# Patient Record
Sex: MALE | Race: WHITE
[De-identification: names, ages, dates, MRNs, and addresses within clinical notes are randomized per-mention and may not be internally consistent; named-entity substitution may affect disease eponyms.]

---

## 2019-01-25 PROBLEM — Z00.00 ENCOUNTER FOR PREVENTIVE HEALTH EXAMINATION: Status: ACTIVE | Noted: 2019-01-25

## 2019-02-11 ENCOUNTER — APPOINTMENT (OUTPATIENT)
Dept: CARDIOLOGY | Facility: CLINIC | Age: 19
End: 2019-02-11
Payer: COMMERCIAL

## 2019-02-11 ENCOUNTER — NON-APPOINTMENT (OUTPATIENT)
Age: 19
End: 2019-02-11

## 2019-02-11 VITALS
HEIGHT: 65 IN | WEIGHT: 198 LBS | HEART RATE: 59 BPM | OXYGEN SATURATION: 100 % | DIASTOLIC BLOOD PRESSURE: 70 MMHG | BODY MASS INDEX: 32.99 KG/M2 | SYSTOLIC BLOOD PRESSURE: 129 MMHG

## 2019-02-11 VITALS — DIASTOLIC BLOOD PRESSURE: 80 MMHG | SYSTOLIC BLOOD PRESSURE: 134 MMHG

## 2019-02-11 DIAGNOSIS — G40.909 EPILEPSY, UNSPECIFIED, NOT INTRACTABLE, W/OUT STATUS EPILEPTICUS: ICD-10-CM

## 2019-02-11 DIAGNOSIS — R00.1 BRADYCARDIA, UNSPECIFIED: ICD-10-CM

## 2019-02-11 DIAGNOSIS — Z78.9 OTHER SPECIFIED HEALTH STATUS: ICD-10-CM

## 2019-02-11 PROCEDURE — 93000 ELECTROCARDIOGRAM COMPLETE: CPT

## 2019-02-11 PROCEDURE — 99204 OFFICE O/P NEW MOD 45 MIN: CPT

## 2019-02-11 RX ORDER — TOPIRAMATE 100 MG/1
100 TABLET, FILM COATED ORAL TWICE DAILY
Refills: 0 | Status: ACTIVE | COMMUNITY
Start: 2019-02-11

## 2019-02-11 RX ORDER — CLOBAZAM 10 MG/1
10 TABLET ORAL EVERY 8 HOURS
Refills: 0 | Status: ACTIVE | COMMUNITY
Start: 2019-02-11

## 2019-02-11 RX ORDER — PHENOBARBITAL 64.8 MG/1
64.8 TABLET ORAL AT BEDTIME
Refills: 0 | Status: ACTIVE | COMMUNITY
Start: 2019-02-11

## 2019-02-11 NOTE — HISTORY OF PRESENT ILLNESS
[FreeTextEntry1] : Patients Father- Berny is in the room, translating\par \par Patient is a 19-year-old  male who is accompanied by his father for the office consultation. Patient is referred by the neurologist. 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 Patient denies any cardiopulmonary complaints. Patient has no prior cardiac history.

## 2019-02-11 NOTE — ASSESSMENT
[FreeTextEntry1] : EKG- Sinus Bradycardia\par \par Assessment:\par \par 1. Sinus Bradycardia\par \par Recommendations:\par 1. Holter monitor for 24 hours\par 2. ECHO\par 3. F/U 2 months

## 2019-02-25 ENCOUNTER — APPOINTMENT (OUTPATIENT)
Dept: CARDIOLOGY | Facility: CLINIC | Age: 19
End: 2019-02-25
Payer: COMMERCIAL

## 2019-02-25 PROCEDURE — 93306 TTE W/DOPPLER COMPLETE: CPT

## 2019-04-05 ENCOUNTER — INPATIENT (INPATIENT)
Facility: HOSPITAL | Age: 19
LOS: 1 days | Discharge: HOME | DRG: 101 | End: 2019-04-07
Attending: HOSPITALIST | Admitting: HOSPITALIST
Payer: SELF-PAY

## 2019-04-05 VITALS
SYSTOLIC BLOOD PRESSURE: 133 MMHG | HEART RATE: 89 BPM | RESPIRATION RATE: 16 BRPM | DIASTOLIC BLOOD PRESSURE: 84 MMHG | OXYGEN SATURATION: 98 % | TEMPERATURE: 98 F

## 2019-04-05 DIAGNOSIS — R56.9 UNSPECIFIED CONVULSIONS: ICD-10-CM

## 2019-04-05 LAB
ALBUMIN SERPL ELPH-MCNC: 4.9 G/DL — SIGNIFICANT CHANGE UP (ref 3.3–5.2)
ALP SERPL-CCNC: 79 U/L — SIGNIFICANT CHANGE UP (ref 40–120)
ALT FLD-CCNC: 40 U/L — SIGNIFICANT CHANGE UP
ANION GAP SERPL CALC-SCNC: 15 MMOL/L — SIGNIFICANT CHANGE UP (ref 5–17)
AST SERPL-CCNC: 24 U/L — SIGNIFICANT CHANGE UP
BASOPHILS # BLD AUTO: 0 K/UL — SIGNIFICANT CHANGE UP (ref 0–0.2)
BASOPHILS NFR BLD AUTO: 0.1 % — SIGNIFICANT CHANGE UP (ref 0–2)
BILIRUB SERPL-MCNC: 0.3 MG/DL — LOW (ref 0.4–2)
BUN SERPL-MCNC: 17 MG/DL — SIGNIFICANT CHANGE UP (ref 8–20)
CALCIUM SERPL-MCNC: 9.3 MG/DL — SIGNIFICANT CHANGE UP (ref 8.6–10.2)
CHLORIDE SERPL-SCNC: 103 MMOL/L — SIGNIFICANT CHANGE UP (ref 98–107)
CO2 SERPL-SCNC: 20 MMOL/L — LOW (ref 22–29)
CREAT SERPL-MCNC: 1.17 MG/DL — SIGNIFICANT CHANGE UP (ref 0.5–1.3)
EOSINOPHIL # BLD AUTO: 0 K/UL — SIGNIFICANT CHANGE UP (ref 0–0.5)
EOSINOPHIL NFR BLD AUTO: 0 % — SIGNIFICANT CHANGE UP (ref 0–5)
GLUCOSE SERPL-MCNC: 116 MG/DL — HIGH (ref 70–115)
HCT VFR BLD CALC: 49.6 % — SIGNIFICANT CHANGE UP (ref 42–52)
HGB BLD-MCNC: 16.9 G/DL — SIGNIFICANT CHANGE UP (ref 14–18)
LYMPHOCYTES # BLD AUTO: 1.1 K/UL — SIGNIFICANT CHANGE UP (ref 1–4.8)
LYMPHOCYTES # BLD AUTO: 5.4 % — LOW (ref 20–55)
MCHC RBC-ENTMCNC: 31 PG — SIGNIFICANT CHANGE UP (ref 27–31)
MCHC RBC-ENTMCNC: 34.1 G/DL — SIGNIFICANT CHANGE UP (ref 32–36)
MCV RBC AUTO: 90.8 FL — SIGNIFICANT CHANGE UP (ref 80–94)
MONOCYTES # BLD AUTO: 1.1 K/UL — HIGH (ref 0–0.8)
MONOCYTES NFR BLD AUTO: 5.7 % — SIGNIFICANT CHANGE UP (ref 3–10)
NEUTROPHILS # BLD AUTO: 17.4 K/UL — HIGH (ref 1.8–8)
NEUTROPHILS NFR BLD AUTO: 88.4 % — HIGH (ref 37–73)
PHENOBARB SERPL-MCNC: <6 UG/ML — LOW (ref 15–40)
PLATELET # BLD AUTO: 221 K/UL — SIGNIFICANT CHANGE UP (ref 150–400)
POTASSIUM SERPL-MCNC: 4.7 MMOL/L — SIGNIFICANT CHANGE UP (ref 3.5–5.3)
POTASSIUM SERPL-SCNC: 4.7 MMOL/L — SIGNIFICANT CHANGE UP (ref 3.5–5.3)
PROT SERPL-MCNC: 9.2 G/DL — HIGH (ref 6.6–8.7)
RBC # BLD: 5.46 M/UL — SIGNIFICANT CHANGE UP (ref 4.6–6.2)
RBC # FLD: 12.5 % — SIGNIFICANT CHANGE UP (ref 11–15.6)
SODIUM SERPL-SCNC: 138 MMOL/L — SIGNIFICANT CHANGE UP (ref 135–145)
WBC # BLD: 19.7 K/UL — HIGH (ref 4.8–10.8)
WBC # FLD AUTO: 19.7 K/UL — HIGH (ref 4.8–10.8)

## 2019-04-05 PROCEDURE — 70450 CT HEAD/BRAIN W/O DYE: CPT | Mod: 26

## 2019-04-05 PROCEDURE — 99285 EMERGENCY DEPT VISIT HI MDM: CPT

## 2019-04-05 PROCEDURE — 99223 1ST HOSP IP/OBS HIGH 75: CPT | Mod: GC

## 2019-04-05 PROCEDURE — 99497 ADVNCD CARE PLAN 30 MIN: CPT | Mod: 25

## 2019-04-05 RX ORDER — PHENOBARBITAL 60 MG
64.8 TABLET ORAL DAILY
Refills: 0 | Status: DISCONTINUED | OUTPATIENT
Start: 2019-04-05 | End: 2019-04-07

## 2019-04-05 RX ORDER — ENOXAPARIN SODIUM 100 MG/ML
40 INJECTION SUBCUTANEOUS EVERY 24 HOURS
Refills: 0 | Status: DISCONTINUED | OUTPATIENT
Start: 2019-04-05 | End: 2019-04-07

## 2019-04-05 RX ORDER — LEVETIRACETAM 250 MG/1
500 TABLET, FILM COATED ORAL EVERY 12 HOURS
Refills: 0 | Status: DISCONTINUED | OUTPATIENT
Start: 2019-04-05 | End: 2019-04-06

## 2019-04-05 RX ORDER — INFLUENZA VIRUS VACCINE 15; 15; 15; 15 UG/.5ML; UG/.5ML; UG/.5ML; UG/.5ML
0.5 SUSPENSION INTRAMUSCULAR ONCE
Refills: 0 | Status: DISCONTINUED | OUTPATIENT
Start: 2019-04-05 | End: 2019-04-07

## 2019-04-05 RX ORDER — TOPIRAMATE 25 MG
100 TABLET ORAL ONCE
Refills: 0 | Status: DISCONTINUED | OUTPATIENT
Start: 2019-04-05 | End: 2019-04-05

## 2019-04-05 RX ORDER — ONDANSETRON 8 MG/1
4 TABLET, FILM COATED ORAL EVERY 6 HOURS
Refills: 0 | Status: DISCONTINUED | OUTPATIENT
Start: 2019-04-05 | End: 2019-04-07

## 2019-04-05 RX ORDER — PHENOBARBITAL 60 MG
129.6 TABLET ORAL AT BEDTIME
Refills: 0 | Status: DISCONTINUED | OUTPATIENT
Start: 2019-04-05 | End: 2019-04-07

## 2019-04-05 RX ORDER — PHENOBARBITAL 60 MG
64.8 TABLET ORAL ONCE
Refills: 0 | Status: DISCONTINUED | OUTPATIENT
Start: 2019-04-05 | End: 2019-04-05

## 2019-04-05 RX ORDER — LEVETIRACETAM 250 MG/1
1000 TABLET, FILM COATED ORAL ONCE
Refills: 0 | Status: COMPLETED | OUTPATIENT
Start: 2019-04-05 | End: 2019-04-05

## 2019-04-05 RX ORDER — CLOBAZAM 10 MG/1
10 TABLET ORAL ONCE
Refills: 0 | Status: DISCONTINUED | OUTPATIENT
Start: 2019-04-05 | End: 2019-04-05

## 2019-04-05 RX ORDER — SODIUM CHLORIDE 9 MG/ML
1000 INJECTION, SOLUTION INTRAVENOUS
Refills: 0 | Status: DISCONTINUED | OUTPATIENT
Start: 2019-04-05 | End: 2019-04-06

## 2019-04-05 RX ADMIN — SODIUM CHLORIDE 125 MILLILITER(S): 9 INJECTION, SOLUTION INTRAVENOUS at 23:19

## 2019-04-05 RX ADMIN — Medication 2 MILLIGRAM(S): at 21:00

## 2019-04-05 RX ADMIN — ENOXAPARIN SODIUM 40 MILLIGRAM(S): 100 INJECTION SUBCUTANEOUS at 23:47

## 2019-04-05 RX ADMIN — LEVETIRACETAM 400 MILLIGRAM(S): 250 TABLET, FILM COATED ORAL at 18:31

## 2019-04-05 RX ADMIN — LEVETIRACETAM 1000 MILLIGRAM(S): 250 TABLET, FILM COATED ORAL at 21:46

## 2019-04-05 RX ADMIN — Medication 404 MILLIGRAM(S): at 21:45

## 2019-04-05 NOTE — ED ADULT NURSE REASSESSMENT NOTE - NS ED NURSE REASSESS COMMENT FT1
patient in no distress off to CT for chest, remains on CM, lactate sent and remains at 2.3 MDA no call from lab, patient started on another liter of fluid will recheck  labs when completed

## 2019-04-05 NOTE — ED ADULT NURSE REASSESSMENT NOTE - NS ED NURSE REASSESS COMMENT FT1
dr. mane at bedside. pt is actively seizing. pt grins teeth, clenches right fist and shakes right foot when actively seizing. ativan given as ordered. pt pupils dilated. vital signs stable, PO medication ordered for patient returned to pharmacy. new medication orders placed.

## 2019-04-05 NOTE — ED PROVIDER NOTE - ATTENDING CONTRIBUTION TO CARE
[place]: I performed a face to face evaluation of this patient and performed a full history and physical examination on the patient.  I agree with the resident's history, physical examination, and plan of the patient unless otherwise noted. My brief assessment is as follows: pt non compliant with phenobarb, onfi, topamax for 1 week due to money issue, picke dup med today, with multiple witnessed seizure while sitting down, no head trauma. Pt post ictal but waking up. NO focal deficits, no f/c. loaded with keppra, to get po meds, neuro aware, admitted to Dr. Schilling.

## 2019-04-05 NOTE — ED PROVIDER NOTE - PHYSICAL EXAMINATION
Petra Hall DO.:   GENERAL: Patient minimally responsive. Responds to sternal rub.  HEENT: NC/AT, Moist mucous membranes, PERRL, EOMI.  LUNGS: CTAB, no wheezes or crackles.   CARDIAC: RRR, no m/r/g.    ABDOMEN: Soft, NT, ND, No rebound, guarding.  EXT: No edema. No calf tenderness.   MSK: No spinal tenderness, no pain with movement, no deformities.  NEURO: A&Ox0 not responding to questioning. Moving all extremities. Not following commands.  SKIN: Warm and dry. No rash.

## 2019-04-05 NOTE — ED ADULT TRIAGE NOTE - CHIEF COMPLAINT QUOTE
Patient arrived via EMS, responsive to painful stimuli, breathing unlabored.  As per mother at bedside, patient had 10 seizures.  No falls with seizures.  As per mother, patient had no taken medication for 1 week due to father not wanting to pay for medication Patient arrived via EMS, responsive to painful stimuli, breathing unlabored.  As per mother at bedside, patient had 10 seizures.  No falls with seizures.  As per mother, patient had no taken medication for 1 week due to father not wanting to pay for medication.  MD called to bedside

## 2019-04-05 NOTE — ED ADULT NURSE REASSESSMENT NOTE - NS ED NURSE REASSESS COMMENT FT1
phenobarbital 64.8 mg wasted from 2ml vial. order changed to ivpb. waste witnessed by kennedy finley RN

## 2019-04-05 NOTE — ED ADULT NURSE NOTE - CHIEF COMPLAINT QUOTE
Patient arrived via EMS, responsive to painful stimuli, breathing unlabored.  As per mother at bedside, patient had 10 seizures.  No falls with seizures.  As per mother, patient had no taken medication for 1 week due to father not wanting to pay for medication.  MD called to bedside

## 2019-04-05 NOTE — ED ADULT NURSE REASSESSMENT NOTE - NS ED NURSE REASSESS COMMENT FT1
pt transport to 4 tower with RN and transport on cardiac monitor. family at bedside and informed of treatment plan. pt tolerated transfer

## 2019-04-05 NOTE — H&P ADULT - ATTENDING COMMENTS
16 min spent discussing advanced care planning and pt is full code as per patient mother and sister at bedside.

## 2019-04-05 NOTE — ED PROVIDER NOTE - OBJECTIVE STATEMENT
19M pmhx of Epilepsy presents from home for 10 witnessed seizures, each lasting 10 minutes (according to mother), with no return to baseline over a period of 7 hours. Seizures were his usual seizures but lasted longer. EMS state that he was on the ground, responsive only to pain and verbal. Not speaking. As per mother, patient has not been taking his antiepileptic meds (Onfi, Phenobarbital, Topamax) due to cost. In ED patient responsive only to pain and verbal. Not able to obtain ROS.    Neurologist: Dr. Blanca

## 2019-04-05 NOTE — H&P ADULT - ASSESSMENT
20 yo male admitted for breakthrough seizures in setting of noncompliance due to financial issues    1) Breakthrough seizure activity  admit to telemetery  q4 neurochecks  aspiration/seizure/fall precautions  NPO until out of post ictal state  Keppra load given, 500mg ivpb bid started  phenobarb started at home dose  clobazam and topiramate doses will be restarted as po when able  Dr. Gonzales called by ED-will evaluate in AM  SW and Case management consulted  CT head wnl  EKG    2) Hyperglycemia  f/u AM labs  NPO  no h/o diabetes or prediabetes    3) Tongue bite during episode of seizure  exam when able, pt post ictal and not following commands  wound care if needed     4) Leukocytosis  likely reactive  no s/s of infectious etiology-no reports of congestion/cough/sick contacts  f/u urinalysis

## 2019-04-05 NOTE — ED PROVIDER NOTE - CLINICAL SUMMARY MEDICAL DECISION MAKING FREE TEXT BOX
19M p/w witnessed seizures at home, not taking meds. Will get labs, CTH. Likely due to medication noncompliance.

## 2019-04-05 NOTE — ED ADULT NURSE NOTE - OBJECTIVE STATEMENT
Patient BIBA to ED today after having what family states was about 15 witnessed seizures today.  Patient responds to verbal stimuli.  Patient does answer questions.  Patient denies pain of any kind.

## 2019-04-05 NOTE — ED ADULT NURSE NOTE - NSIMPLEMENTINTERV_GEN_ALL_ED
Implemented All Universal Safety Interventions:  [place] to call system. Call bell, personal items and telephone within reach. Instruct patient to call for assistance. Room bathroom lighting operational. Non-slip footwear when patient is off stretcher. Physically safe environment: no spills, clutter or unnecessary equipment. Stretcher in lowest position, wheels locked, appropriate side rails in place.

## 2019-04-05 NOTE — H&P ADULT - HISTORY OF PRESENT ILLNESS
Pt is a 18 yo male with a pmh/o epilepsy since 4 days old, who presents to ED due to seizures which occurred after not taking his medication regimen for about a week due to finances as they cost 'thousands' of dollars. History taken from family at bedside as pt is post ictal, lethargic, and only responding to painful stimuli. Pt unable to provide ROS. Pt mother at bedside states pt presents first with jaw and tongue fasciculations which then turn to eyes rolling back and pt sometimes kicks/clenches fists/contracts arms. Pt had seizure in front of MD at bedside and tongue bite noted during beginning of jaw clenching/opening. Pt then clenched fists and contracted mainly right extremities and noted to have eyes rolled back and then closed with equally dilated pupils. Also noted to have right leg shaking. Episode did not resolve after approx 1 min and ativan given with relief. Pt had some urinary incontinence and remains post ictal at this time.

## 2019-04-06 DIAGNOSIS — G93.49 OTHER ENCEPHALOPATHY: ICD-10-CM

## 2019-04-06 DIAGNOSIS — G40.909 EPILEPSY, UNSPECIFIED, NOT INTRACTABLE, WITHOUT STATUS EPILEPTICUS: ICD-10-CM

## 2019-04-06 LAB
ALBUMIN SERPL ELPH-MCNC: 4.1 G/DL — SIGNIFICANT CHANGE UP (ref 3.3–5.2)
ALP SERPL-CCNC: 72 U/L — SIGNIFICANT CHANGE UP (ref 40–120)
ALT FLD-CCNC: 31 U/L — SIGNIFICANT CHANGE UP
ANION GAP SERPL CALC-SCNC: 16 MMOL/L — SIGNIFICANT CHANGE UP (ref 5–17)
APPEARANCE UR: CLEAR — SIGNIFICANT CHANGE UP
APTT BLD: 34.6 SEC — SIGNIFICANT CHANGE UP (ref 27.5–36.3)
APTT BLD: 35.8 SEC — SIGNIFICANT CHANGE UP (ref 27.5–36.3)
AST SERPL-CCNC: 18 U/L — SIGNIFICANT CHANGE UP
BACTERIA # UR AUTO: NEGATIVE — SIGNIFICANT CHANGE UP
BASOPHILS # BLD AUTO: 0 K/UL — SIGNIFICANT CHANGE UP (ref 0–0.2)
BASOPHILS NFR BLD AUTO: 0.2 % — SIGNIFICANT CHANGE UP (ref 0–2)
BILIRUB SERPL-MCNC: 0.3 MG/DL — LOW (ref 0.4–2)
BILIRUB UR-MCNC: NEGATIVE — SIGNIFICANT CHANGE UP
BUN SERPL-MCNC: 19 MG/DL — SIGNIFICANT CHANGE UP (ref 8–20)
CALCIUM SERPL-MCNC: 8.8 MG/DL — SIGNIFICANT CHANGE UP (ref 8.6–10.2)
CHLORIDE SERPL-SCNC: 107 MMOL/L — SIGNIFICANT CHANGE UP (ref 98–107)
CK MB CFR SERPL CALC: 1.2 NG/ML — SIGNIFICANT CHANGE UP (ref 0–6.7)
CK SERPL-CCNC: 183 U/L — SIGNIFICANT CHANGE UP (ref 30–200)
CO2 SERPL-SCNC: 19 MMOL/L — LOW (ref 22–29)
COLOR SPEC: YELLOW — SIGNIFICANT CHANGE UP
CREAT SERPL-MCNC: 1.08 MG/DL — SIGNIFICANT CHANGE UP (ref 0.5–1.3)
DIFF PNL FLD: ABNORMAL
EOSINOPHIL # BLD AUTO: 0 K/UL — SIGNIFICANT CHANGE UP (ref 0–0.5)
EOSINOPHIL NFR BLD AUTO: 0.4 % — SIGNIFICANT CHANGE UP (ref 0–5)
EPI CELLS # UR: NEGATIVE — SIGNIFICANT CHANGE UP
ETHANOL SERPL-MCNC: <10 MG/DL — SIGNIFICANT CHANGE UP
GLUCOSE SERPL-MCNC: 91 MG/DL — SIGNIFICANT CHANGE UP (ref 70–115)
GLUCOSE UR QL: NEGATIVE MG/DL — SIGNIFICANT CHANGE UP
HBA1C BLD-MCNC: 5.3 % — SIGNIFICANT CHANGE UP (ref 4–5.6)
HCT VFR BLD CALC: 45.7 % — SIGNIFICANT CHANGE UP (ref 42–52)
HGB BLD-MCNC: 15.5 G/DL — SIGNIFICANT CHANGE UP (ref 14–18)
INR BLD: 1.14 RATIO — SIGNIFICANT CHANGE UP (ref 0.88–1.16)
INR BLD: 1.17 RATIO — HIGH (ref 0.88–1.16)
KETONES UR-MCNC: ABNORMAL
LEUKOCYTE ESTERASE UR-ACNC: NEGATIVE — SIGNIFICANT CHANGE UP
LYMPHOCYTES # BLD AUTO: 2.9 K/UL — SIGNIFICANT CHANGE UP (ref 1–4.8)
LYMPHOCYTES # BLD AUTO: 20.7 % — SIGNIFICANT CHANGE UP (ref 20–55)
MAGNESIUM SERPL-MCNC: 2.8 MG/DL — HIGH (ref 1.6–2.6)
MCHC RBC-ENTMCNC: 31 PG — SIGNIFICANT CHANGE UP (ref 27–31)
MCHC RBC-ENTMCNC: 33.9 G/DL — SIGNIFICANT CHANGE UP (ref 32–36)
MCV RBC AUTO: 91.4 FL — SIGNIFICANT CHANGE UP (ref 80–94)
MONOCYTES # BLD AUTO: 1.2 K/UL — HIGH (ref 0–0.8)
MONOCYTES NFR BLD AUTO: 8.6 % — SIGNIFICANT CHANGE UP (ref 3–10)
NEUTROPHILS # BLD AUTO: 9.8 K/UL — HIGH (ref 1.8–8)
NEUTROPHILS NFR BLD AUTO: 69.8 % — SIGNIFICANT CHANGE UP (ref 37–73)
NITRITE UR-MCNC: NEGATIVE — SIGNIFICANT CHANGE UP
PCP SPEC-MCNC: SIGNIFICANT CHANGE UP
PH UR: 6 — SIGNIFICANT CHANGE UP (ref 5–8)
PHOSPHATE SERPL-MCNC: 2.8 MG/DL — SIGNIFICANT CHANGE UP (ref 2.4–4.7)
PLATELET # BLD AUTO: 216 K/UL — SIGNIFICANT CHANGE UP (ref 150–400)
POTASSIUM SERPL-MCNC: 3.8 MMOL/L — SIGNIFICANT CHANGE UP (ref 3.5–5.3)
POTASSIUM SERPL-SCNC: 3.8 MMOL/L — SIGNIFICANT CHANGE UP (ref 3.5–5.3)
PROT SERPL-MCNC: 7.9 G/DL — SIGNIFICANT CHANGE UP (ref 6.6–8.7)
PROT UR-MCNC: 30 MG/DL
PROTHROM AB SERPL-ACNC: 13.2 SEC — HIGH (ref 10–12.9)
PROTHROM AB SERPL-ACNC: 13.5 SEC — HIGH (ref 10–12.9)
RBC # BLD: 5 M/UL — SIGNIFICANT CHANGE UP (ref 4.6–6.2)
RBC # FLD: 12.8 % — SIGNIFICANT CHANGE UP (ref 11–15.6)
RBC CASTS # UR COMP ASSIST: ABNORMAL /HPF (ref 0–4)
SODIUM SERPL-SCNC: 142 MMOL/L — SIGNIFICANT CHANGE UP (ref 135–145)
SP GR SPEC: 1.02 — SIGNIFICANT CHANGE UP (ref 1.01–1.02)
TSH SERPL-MCNC: 2.87 UIU/ML — SIGNIFICANT CHANGE UP (ref 0.27–4.2)
UROBILINOGEN FLD QL: NEGATIVE MG/DL — SIGNIFICANT CHANGE UP
WBC # BLD: 14 K/UL — HIGH (ref 4.8–10.8)
WBC # FLD AUTO: 14 K/UL — HIGH (ref 4.8–10.8)
WBC UR QL: SIGNIFICANT CHANGE UP

## 2019-04-06 PROCEDURE — 99233 SBSQ HOSP IP/OBS HIGH 50: CPT

## 2019-04-06 RX ORDER — CLOBAZAM 10 MG/1
10 TABLET ORAL
Refills: 0 | Status: DISCONTINUED | OUTPATIENT
Start: 2019-04-06 | End: 2019-04-07

## 2019-04-06 RX ORDER — TOPIRAMATE 25 MG
100 TABLET ORAL
Refills: 0 | Status: DISCONTINUED | OUTPATIENT
Start: 2019-04-06 | End: 2019-04-07

## 2019-04-06 RX ADMIN — Medication 404 MILLIGRAM(S): at 12:15

## 2019-04-06 RX ADMIN — Medication 202 MILLIGRAM(S): at 23:52

## 2019-04-06 RX ADMIN — CLOBAZAM 10 MILLIGRAM(S): 10 TABLET ORAL at 15:00

## 2019-04-06 RX ADMIN — CLOBAZAM 10 MILLIGRAM(S): 10 TABLET ORAL at 21:41

## 2019-04-06 RX ADMIN — Medication 100 MILLIGRAM(S): at 14:56

## 2019-04-06 RX ADMIN — LEVETIRACETAM 420 MILLIGRAM(S): 250 TABLET, FILM COATED ORAL at 05:58

## 2019-04-06 RX ADMIN — ENOXAPARIN SODIUM 40 MILLIGRAM(S): 100 INJECTION SUBCUTANEOUS at 23:30

## 2019-04-06 NOTE — PROGRESS NOTE ADULT - ASSESSMENT
18 yo male admitted for breakthrough seizures in setting of noncompliance due to financial issues    1) Breakthrough seizure activity due to non compliance  q4 neurochecks  aspiration/seizure/fall precaution  SW and Case management consulted  CT head wnl  Neuro appreciated   Continue Topamax 100mg po q12.  Restart Onfi 10mg po q18.  Phenobarb 64.8mg po qam and 129.6 mg po qpm  Keppra stopped.  No driving.  Pt to get his insurance card on Monday as per mom. Await  assistance for d/c home      2) Hyperglycemia  A1c 5.3    3) Tongue bite during episode of seizure  exam when able, pt post ictal and not following commands  wound care if needed     4) Leukocytosis  likely reactive  no s/s of infectious etiology-no reports of congestion/cough/sick contacts  f/u urinalysis

## 2019-04-06 NOTE — CONSULT NOTE ADULT - ASSESSMENT
Impression:  Seizure disorder due to med non-compliance.    Plan:    Continue Topamax 100mg po q12.  Restart Onfi 10mg po q18.  Phenobarb 64.8mg po qam and 129.6 mg po qpm  Keppra stopped.  Maintain seizure precautions.  No driving.  D/w pt and family and Dr Traylor.  Pt needs to d/w our office for manufacturers coupons/vouchers to assist with meds if needed.  Outpatient follow up at [facility] at [phone] or [phone] with Dr Blanca.  Reconsult PRN.

## 2019-04-06 NOTE — CONSULT NOTE ADULT - SUBJECTIVE AND OBJECTIVE BOX
HPI: 19yMale  sees Dr Blanca at [facility] last office visit 01/2019 with a pmh/o chronic static encephalopathy with epilepsy since age 4 days old, who presents to ED due to seizures which occurred after not taking his medication regimen for about a week due to finances as they cost 'thousands' of dollars. History taken from family at bedside as pt is post ictal, lethargic, and only responding to painful stimuli. Pt unable to provide ROS. Pt mother at bedside states pt presents first with jaw and tongue fasciculations which then turn to eyes rolling back and pt sometimes kicks/clenches fists/contracts arms. Pt had seizure in front of MD at bedside in ER and tongue bite noted during beginning of jaw clenching/opening. Pt then clenched fists and contracted mainly right extremities and noted to have eyes rolled back and then closed with equally dilated pupils. Also noted to have right leg shaking. Episode did not resolve after approx 1 min and ativan given with relief. Pt had some urinary incontinence in ER.  Now seen at bedside on floor feels well.  They are in midst of getting insurance back by tuesday as per parents.  Home meds restarted now.  No head injuries or fevers.      PAST MEDICAL & SURGICAL HISTORY:  Epilepsy  No significant past surgical history    MEDICATIONS  (STANDING):  Clobazam 10 milliGRAM(s) Oral <User Schedule>  enoxaparin Injectable 40 milliGRAM(s) SubCutaneous every 24 hours  influenza   Vaccine 0.5 milliLiter(s) IntraMuscular once  multiple electrolytes Injection Type 1 1000 milliLiter(s) (125 mL/Hr) IV Continuous <Continuous>  PHENobarbital IVPB 64.8 milliGRAM(s) IV Intermittent daily  PHENobarbital IVPB 129.6 milliGRAM(s) IV Intermittent at bedtime  topiramate 100 milliGRAM(s) Oral two times a day    MEDICATIONS  (PRN):  LORazepam   Injectable 2 milliGRAM(s) IV Push every 15 minutes PRN breakthrough seizure  ondansetron Injectable 4 milliGRAM(s) IV Push every 6 hours PRN Nausea    Allergies    No Known Allergies    Intolerances        FAMILY HISTORY:  No pertinent family history in first degree relatives          SOCIAL HISTORY:  Denies toxic habits;     REVIEW OF SYSTEMS:    As noted in the HPI.    VITAL SIGNS:  Vital Signs Last 24 Hrs  T(C): 36.9 (06 Apr 2019 10:02), Max: 36.9 (06 Apr 2019 10:02)  T(F): 98.4 (06 Apr 2019 10:02), Max: 98.4 (06 Apr 2019 10:02)  HR: 81 (06 Apr 2019 10:02) (81 - 100)  BP: 123/74 (06 Apr 2019 10:02) (109/71 - 133/84)  BP(mean): --  RR: 16 (06 Apr 2019 10:02) (16 - 18)  SpO2: 97% (06 Apr 2019 05:54) (96% - 98%)    PHYSICAL EXAMINATION:  General: Well-developed, well nourished, in no acute distress.  Eyes: Conjunctiva and sclera clear. Fundoscopic examination was deferred.  Neck: Supple.  Cardiac: +S1 & S2; Regular.  Chest: CTA b/l.    Musculoskeletal: No tenderness on palpation of spine.  No Brudzinski/Kernig's sign.    Neurologic:  - Mental Status:  Alert, awake, oriented to person, place, and time; Speech is fair and psychomotor slowing since birth per family, and comprehension  Cranial Nerves II-XII:    II:  Visual acuity is normal for age ; Visual fields are full to confrontation; Pupils are equal, round, and reactive to light.  III, IV, VI:  Extraocular movements are intact without nystagmus.  V:  Facial sensation is intact in the V1-V3 distribution bilaterally.  VII:  Face is symmetric with normal eye closure and smile  VIII:  Hearing is intact and symmetric for age  IX, X:  Uvula is midline and soft palate rises symmetrically  XI:  Head turning and shoulder shrug are intact.  XII:  Tongue protrudes in the midline.  - Motor:  Strength is 5/5 throughout.  There is no pronator drift.  Normal muscle bulk and tone throughout.  - Reflexes:  2+ and symmetric throughout.  - Sensory:  Intact and symmetric to light touch, and joint-position sense.  - Coordination:  No dysmetria/dysdiadochokinesis.   - Gait: Deferred.      LABS:                          15.5   14.0  )-----------( 216      ( 06 Apr 2019 06:16 )             45.7     06 Apr 2019 06:16    142    |  107    |  19.0   ----------------------------<  91     3.8     |  19.0   |  1.08     Ca    8.8        06 Apr 2019 06:16  Phos  2.8       06 Apr 2019 06:16  Mg     2.8       06 Apr 2019 06:16    TPro  7.9    /  Alb  4.1    /  TBili  0.3    /  DBili  x      /  AST  18     /  ALT  31     /  AlkPhos  72     06 Apr 2019 06:16    LIVER FUNCTIONS - ( 06 Apr 2019 06:16 )  Alb: 4.1 g/dL / Pro: 7.9 g/dL / ALK PHOS: 72 U/L / ALT: 31 U/L / AST: 18 U/L / GGT: x           PT/INR - ( 06 Apr 2019 06:16 )   PT: 13.2 sec;   INR: 1.14 ratio         PTT - ( 06 Apr 2019 06:16 )  PTT:35.8 sec  Phenobarb level on admission <6  UDS + for bzd/roxanne    RADIOLOGY & ADDITIONAL STUDIES:      CT Head No Cont (04.05.19 @ 19:52) >  No acute intracranial findings.

## 2019-04-06 NOTE — PROGRESS NOTE ADULT - SUBJECTIVE AND OBJECTIVE BOX
CHIEF COMPLAINT/INTERVAL HISTORY:    Patient is a 19y old  Male who presents with a chief complaint of breakthrough seizures due to non compliance (2019 13:59)      HPI:  Pt is a 20 yo male with a pmh/o epilepsy since 4 days old, who presents to ED due to seizures which occurred after not taking his medication regimen for about a week due to finances as they cost 'thousands' of dollars. History taken from family at bedside as pt is post ictal, lethargic, and only responding to painful stimuli. Pt unable to provide ROS. Pt mother at bedside states pt presents first with jaw and tongue fasciculations which then turn to eyes rolling back and pt sometimes kicks/clenches fists/contracts arms. Pt had seizure in front of MD at bedside and tongue bite noted during beginning of jaw clenching/opening. Pt then clenched fists and contracted mainly right extremities and noted to have eyes rolled back and then closed with equally dilated pupils. Also noted to have right leg shaking. Episode did not resolve after approx 1 min and ativan given with relief. Pt had some urinary incontinence and remains post ictal at this time. (2019 21:11)      SUBJECTIVE & OBJECTIVE/ ROS: Pt seen and examined at bedside.  # [id number]. No further seizures. No chest pain, palpitations, sob, light headedness/dizziness, difficulty breathing/cough, fevers/chills, abdominal pain, n/v, diarrhea/constipation, dysuria or increased urinary frequency.     ICU Vital Signs Last 24 Hrs  T(C): 36.9 (2019 15:16), Max: 36.9 (2019 10:02)  T(F): 98.5 (2019 15:16), Max: 98.5 (2019 15:16)  HR: 64 (2019 15:16) (64 - 100)  BP: 113/74 (2019 15:16) (109/71 - 133/84)  BP(mean): --  ABP: --  ABP(mean): --  RR: 18 (2019 15:16) (16 - 18)  SpO2: 100% (2019 15:16) (96% - 100%)        MEDICATIONS  (STANDING):  Clobazam 10 milliGRAM(s) Oral <User Schedule>  enoxaparin Injectable 40 milliGRAM(s) SubCutaneous every 24 hours  influenza   Vaccine 0.5 milliLiter(s) IntraMuscular once  PHENobarbital IVPB 64.8 milliGRAM(s) IV Intermittent daily  PHENobarbital IVPB 129.6 milliGRAM(s) IV Intermittent at bedtime  topiramate 100 milliGRAM(s) Oral two times a day    MEDICATIONS  (PRN):  LORazepam   Injectable 2 milliGRAM(s) IV Push every 15 minutes PRN breakthrough seizure  ondansetron Injectable 4 milliGRAM(s) IV Push every 6 hours PRN Nausea      LABS:                        15.5   14.0  )-----------( 216      ( 2019 06:16 )             45.7     04-06    142  |  107  |  19.0  ----------------------------<  91  3.8   |  19.0<L>  |  1.08    Ca    8.8      2019 06:16  Phos  2.8     04-06  Mg     2.8     04-06    TPro  7.9  /  Alb  4.1  /  TBili  0.3<L>  /  DBili  x   /  AST  18  /  ALT  31  /  AlkPhos  72  04-06    PT/INR - ( 2019 06:16 )   PT: 13.2 sec;   INR: 1.14 ratio         PTT - ( 2019 06:16 )  PTT:35.8 sec  Urinalysis Basic - ( 2019 10:47 )    Color: Yellow / Appearance: Clear / S.020 / pH: x  Gluc: x / Ketone: Trace  / Bili: Negative / Urobili: Negative mg/dL   Blood: x / Protein: 30 mg/dL / Nitrite: Negative   Leuk Esterase: Negative / RBC: 3-5 /HPF / WBC 3-5   Sq Epi: x / Non Sq Epi: Negative / Bacteria: Negative        CAPILLARY BLOOD GLUCOSE      POCT Blood Glucose.: 91 mg/dL (2019 17:51)      RECENT CULTURES:      RADIOLOGY & ADDITIONAL TESTS:      PHYSICAL EXAM:    GENERAL: NAD, well-groomed, well-developed  HEAD:  Atraumatic, Normocephalic  EYES: EOMI, PERRLA, conjunctiva and sclera clear  ENMT: Moist mucous membranes  NECK: Supple, No JVD  NERVOUS SYSTEM:  Alert & Oriented X3, Motor Strength 5/5 B/L upper and lower extremities; DTRs 2+ intact and symmetric  CHEST/LUNG: Clear to auscultation bilaterally; No rales, rhonchi, wheezing, or rubs  HEART: Regular rate and rhythm; No murmurs, rubs, or gallops  ABDOMEN: Soft, Nontender, Nondistended; Bowel sounds present  EXTREMITIES:  2+ Peripheral Pulses, No clubbing, cyanosis, or edema

## 2019-04-07 ENCOUNTER — TRANSCRIPTION ENCOUNTER (OUTPATIENT)
Age: 19
End: 2019-04-07

## 2019-04-07 VITALS
SYSTOLIC BLOOD PRESSURE: 130 MMHG | RESPIRATION RATE: 19 BRPM | HEART RATE: 67 BPM | TEMPERATURE: 98 F | DIASTOLIC BLOOD PRESSURE: 80 MMHG | OXYGEN SATURATION: 98 %

## 2019-04-07 LAB
ANION GAP SERPL CALC-SCNC: 12 MMOL/L — SIGNIFICANT CHANGE UP (ref 5–17)
BASOPHILS # BLD AUTO: 0 K/UL — SIGNIFICANT CHANGE UP (ref 0–0.2)
BASOPHILS NFR BLD AUTO: 0.4 % — SIGNIFICANT CHANGE UP (ref 0–2)
BUN SERPL-MCNC: 18 MG/DL — SIGNIFICANT CHANGE UP (ref 8–20)
CALCIUM SERPL-MCNC: 8.7 MG/DL — SIGNIFICANT CHANGE UP (ref 8.6–10.2)
CHLORIDE SERPL-SCNC: 108 MMOL/L — HIGH (ref 98–107)
CO2 SERPL-SCNC: 21 MMOL/L — LOW (ref 22–29)
CREAT SERPL-MCNC: 1.1 MG/DL — SIGNIFICANT CHANGE UP (ref 0.5–1.3)
EOSINOPHIL # BLD AUTO: 0.1 K/UL — SIGNIFICANT CHANGE UP (ref 0–0.5)
EOSINOPHIL NFR BLD AUTO: 1.3 % — SIGNIFICANT CHANGE UP (ref 0–5)
GLUCOSE SERPL-MCNC: 86 MG/DL — SIGNIFICANT CHANGE UP (ref 70–115)
HCT VFR BLD CALC: 43.7 % — SIGNIFICANT CHANGE UP (ref 42–52)
HGB BLD-MCNC: 14.3 G/DL — SIGNIFICANT CHANGE UP (ref 14–18)
LYMPHOCYTES # BLD AUTO: 2.6 K/UL — SIGNIFICANT CHANGE UP (ref 1–4.8)
LYMPHOCYTES # BLD AUTO: 31.8 % — SIGNIFICANT CHANGE UP (ref 20–55)
MAGNESIUM SERPL-MCNC: 2.7 MG/DL — HIGH (ref 1.6–2.6)
MCHC RBC-ENTMCNC: 30.2 PG — SIGNIFICANT CHANGE UP (ref 27–31)
MCHC RBC-ENTMCNC: 32.7 G/DL — SIGNIFICANT CHANGE UP (ref 32–36)
MCV RBC AUTO: 92.2 FL — SIGNIFICANT CHANGE UP (ref 80–94)
MONOCYTES # BLD AUTO: 0.8 K/UL — SIGNIFICANT CHANGE UP (ref 0–0.8)
MONOCYTES NFR BLD AUTO: 9.4 % — SIGNIFICANT CHANGE UP (ref 3–10)
NEUTROPHILS # BLD AUTO: 4.7 K/UL — SIGNIFICANT CHANGE UP (ref 1.8–8)
NEUTROPHILS NFR BLD AUTO: 56.9 % — SIGNIFICANT CHANGE UP (ref 37–73)
PHOSPHATE SERPL-MCNC: 2.3 MG/DL — LOW (ref 2.4–4.7)
PLATELET # BLD AUTO: 208 K/UL — SIGNIFICANT CHANGE UP (ref 150–400)
POTASSIUM SERPL-MCNC: 4 MMOL/L — SIGNIFICANT CHANGE UP (ref 3.5–5.3)
POTASSIUM SERPL-SCNC: 4 MMOL/L — SIGNIFICANT CHANGE UP (ref 3.5–5.3)
RBC # BLD: 4.74 M/UL — SIGNIFICANT CHANGE UP (ref 4.6–6.2)
RBC # FLD: 12.6 % — SIGNIFICANT CHANGE UP (ref 11–15.6)
SODIUM SERPL-SCNC: 141 MMOL/L — SIGNIFICANT CHANGE UP (ref 135–145)
WBC # BLD: 8.3 K/UL — SIGNIFICANT CHANGE UP (ref 4.8–10.8)
WBC # FLD AUTO: 8.3 K/UL — SIGNIFICANT CHANGE UP (ref 4.8–10.8)

## 2019-04-07 PROCEDURE — 80048 BASIC METABOLIC PNL TOTAL CA: CPT

## 2019-04-07 PROCEDURE — 96374 THER/PROPH/DIAG INJ IV PUSH: CPT | Mod: 25

## 2019-04-07 PROCEDURE — 82550 ASSAY OF CK (CPK): CPT

## 2019-04-07 PROCEDURE — 80053 COMPREHEN METABOLIC PANEL: CPT

## 2019-04-07 PROCEDURE — 82962 GLUCOSE BLOOD TEST: CPT

## 2019-04-07 PROCEDURE — 80184 ASSAY OF PHENOBARBITAL: CPT

## 2019-04-07 PROCEDURE — 80307 DRUG TEST PRSMV CHEM ANLYZR: CPT

## 2019-04-07 PROCEDURE — 81001 URINALYSIS AUTO W/SCOPE: CPT

## 2019-04-07 PROCEDURE — 70450 CT HEAD/BRAIN W/O DYE: CPT

## 2019-04-07 PROCEDURE — 85610 PROTHROMBIN TIME: CPT

## 2019-04-07 PROCEDURE — 84443 ASSAY THYROID STIM HORMONE: CPT

## 2019-04-07 PROCEDURE — 84100 ASSAY OF PHOSPHORUS: CPT

## 2019-04-07 PROCEDURE — 99239 HOSP IP/OBS DSCHRG MGMT >30: CPT

## 2019-04-07 PROCEDURE — 80201 ASSAY OF TOPIRAMATE: CPT

## 2019-04-07 PROCEDURE — 83036 HEMOGLOBIN GLYCOSYLATED A1C: CPT

## 2019-04-07 PROCEDURE — T1013: CPT

## 2019-04-07 PROCEDURE — 99285 EMERGENCY DEPT VISIT HI MDM: CPT | Mod: 25

## 2019-04-07 PROCEDURE — 85027 COMPLETE CBC AUTOMATED: CPT

## 2019-04-07 PROCEDURE — G0480: CPT

## 2019-04-07 PROCEDURE — 82553 CREATINE MB FRACTION: CPT

## 2019-04-07 PROCEDURE — 85730 THROMBOPLASTIN TIME PARTIAL: CPT

## 2019-04-07 PROCEDURE — 83735 ASSAY OF MAGNESIUM: CPT

## 2019-04-07 PROCEDURE — 36415 COLL VENOUS BLD VENIPUNCTURE: CPT

## 2019-04-07 RX ORDER — PHENOBARBITAL 60 MG
1 TABLET ORAL
Qty: 30 | Refills: 2
Start: 2019-04-07 | End: 2019-07-05

## 2019-04-07 RX ORDER — CLOBAZAM 10 MG/1
1 TABLET ORAL
Qty: 90 | Refills: 2
Start: 2019-04-07 | End: 2019-07-05

## 2019-04-07 RX ORDER — CLOBAZAM 10 MG/1
1 TABLET ORAL
Qty: 0 | Refills: 0 | DISCHARGE
Start: 2019-04-07

## 2019-04-07 RX ORDER — TOPIRAMATE 25 MG
1 TABLET ORAL
Qty: 30 | Refills: 2
Start: 2019-04-07 | End: 2019-07-05

## 2019-04-07 RX ORDER — PHENOBARBITAL 60 MG
2 TABLET ORAL
Qty: 60 | Refills: 2
Start: 2019-04-07 | End: 2019-07-05

## 2019-04-07 RX ADMIN — CLOBAZAM 10 MILLIGRAM(S): 10 TABLET ORAL at 05:40

## 2019-04-07 RX ADMIN — Medication 404 MILLIGRAM(S): at 12:37

## 2019-04-07 RX ADMIN — Medication 100 MILLIGRAM(S): at 00:21

## 2019-04-07 RX ADMIN — Medication 100 MILLIGRAM(S): at 11:19

## 2019-04-07 RX ADMIN — CLOBAZAM 10 MILLIGRAM(S): 10 TABLET ORAL at 15:12

## 2019-04-07 NOTE — DISCHARGE NOTE PROVIDER - CARE PROVIDER_API CALL
Jatin Blanca (DO)  Neurology  [address] [place] [address]  Phone: [phone]  Fax: [phone]  Follow Up Time: 1 week

## 2019-04-07 NOTE — DISCHARGE NOTE PROVIDER - HOSPITAL COURSE
18 yo male admitted for breakthrough seizures in setting of noncompliance due to financial issues. No further seizures inhouse.     SW and Case management consulted    CT head wnl    Neuro appreciated     Continue Topamax 100mg po q12.    Restart Onfi 10mg po q8.    Phenobarb 64.8mg po qam and 129.6 mg po qpm    Keppra stopped.    No driving.    Pt to get his insurance card on Monday as per mom. SW found out about med copays. Pt's family ready to pay out of pocket for 1 weeks supply until insurance kicks in. Disccussed with pt's family at bedside who agree with the plan.               2) Hyperglycemia    A1c 5.3            3) Leukocytosis    likely reactive    no s/s of infectious etiology-no reports of congestion/cough/sick contacts    urinalysis -ve for infection            PHYSICAL EXAM:        GENERAL: NAD, well-groomed, well-developed    HEAD:  Atraumatic, Normocephalic    EYES: EOMI, PERRLA, conjunctiva and sclera clear    ENMT: Moist mucous membranes    NECK: Supple, No JVD    NERVOUS SYSTEM:  Alert & Oriented X3, Motor Strength 5/5 B/L upper and lower extremities; DTRs 2+ intact and symmetric    CHEST/LUNG: Clear to auscultation bilaterally; No rales, rhonchi, wheezing, or rubs    HEART: Regular rate and rhythm; No murmurs, rubs, or gallops    ABDOMEN: Soft, Nontender, Nondistended; Bowel sounds present    EXTREMITIES:  2+ Peripheral Pulses, No clubbing, cyanosis, or edema        VSS. Medically stable for d/c

## 2019-04-07 NOTE — DISCHARGE NOTE PROVIDER - NSDCCPCAREPLAN_GEN_ALL_CORE_FT
PRINCIPAL DISCHARGE DIAGNOSIS  Diagnosis: Seizures  Assessment and Plan of Treatment: Do not drive. Continue with home meds as directed. Follow up with your primary doctor & neurologist within 1 week of discharge

## 2019-04-07 NOTE — DISCHARGE NOTE NURSING/CASE MANAGEMENT/SOCIAL WORK - NSDCDPATPORTLINK_GEN_ALL_CORE
You can access the [facility] Patient Portal, offered by [facility], by registering with the following website: [email or web address]

## 2019-04-09 LAB — TOPIRAMATE SERPL-MCNC: 3.4 MCG/ML — SIGNIFICANT CHANGE UP

## 2019-04-11 LAB
CLOBAZAM + NOR PNL SERPL: 113 NG/ML — SIGNIFICANT CHANGE UP (ref 30–300)
DESMETHYLCLOBAZAM: 111 NG/ML — LOW (ref 300–3000)

## 2019-04-15 ENCOUNTER — APPOINTMENT (OUTPATIENT)
Dept: CARDIOLOGY | Facility: CLINIC | Age: 19
End: 2019-04-15

## 2019-05-24 ENCOUNTER — APPOINTMENT (OUTPATIENT)
Dept: CARDIOLOGY | Facility: CLINIC | Age: 19
End: 2019-05-24

## 2020-07-16 NOTE — PATIENT PROFILE ADULT - PATIENT/CAREGIVER ACCEPTED INTERPRETER SERVICES
no Enbrel Counseling:  I discussed with the patient the risks of etanercept including but not limited to myelosuppression, immunosuppression, autoimmune hepatitis, demyelinating diseases, lymphoma, and infections.  The patient understands that monitoring is required including a PPD at baseline and must alert us or the primary physician if symptoms of infection or other concerning signs are noted.

## 2021-03-27 ENCOUNTER — OUTPATIENT (OUTPATIENT)
Dept: OUTPATIENT SERVICES | Facility: HOSPITAL | Age: 21
LOS: 1 days | End: 2021-03-27
Payer: COMMERCIAL

## 2021-03-27 DIAGNOSIS — Z20.828 CONTACT WITH AND (SUSPECTED) EXPOSURE TO OTHER VIRAL COMMUNICABLE DISEASES: ICD-10-CM

## 2021-03-27 LAB — SARS-COV-2 RNA SPEC QL NAA+PROBE: SIGNIFICANT CHANGE UP

## 2021-03-27 PROCEDURE — U0003: CPT

## 2021-03-27 PROCEDURE — U0005: CPT

## 2021-03-27 PROCEDURE — C9803: CPT

## 2021-03-28 DIAGNOSIS — Z20.828 CONTACT WITH AND (SUSPECTED) EXPOSURE TO OTHER VIRAL COMMUNICABLE DISEASES: ICD-10-CM

## 2022-01-02 NOTE — PHYSICAL EXAM
[General Appearance - Well Developed] : well developed [Well Groomed] : well groomed [General Appearance - Well Nourished] : well nourished [Normal Conjunctiva] : the conjunctiva exhibited no abnormalities [Eyelids - No Xanthelasma] : the eyelids demonstrated no xanthelasmas [Normal Oral Mucosa] : normal oral mucosa [No Oral Pallor] : no oral pallor [No Oral Cyanosis] : no oral cyanosis [Normal Jugular Venous A Waves Present] : normal jugular venous A waves present [Normal Jugular Venous V Waves Present] : normal jugular venous V waves present [No Jugular Venous Connor A Waves] : no jugular venous connor A waves [Heart Rate And Rhythm] : heart rate and rhythm were normal [Heart Sounds] : normal S1 and S2 [Murmurs] : no murmurs present [Respiration, Rhythm And Depth] : normal respiratory rhythm and effort [Exaggerated Use Of Accessory Muscles For Inspiration] : no accessory muscle use [Auscultation Breath Sounds / Voice Sounds] : lungs were clear to auscultation bilaterally [Abdomen Soft] : soft [Abdomen Tenderness] : non-tender [Abdomen Mass (___ Cm)] : no abdominal mass palpated [Nail Clubbing] : no clubbing of the fingernails [Cyanosis, Localized] : no localized cyanosis [FreeTextEntry1] : Left upper extremity is poorly developed with deformities [Skin Color & Pigmentation] : normal skin color and pigmentation [] : no rash [No Venous Stasis] : no venous stasis 1 [Skin Lesions] : no skin lesions [No Skin Ulcers] : no skin ulcer [No Xanthoma] : no  xanthoma was observed Principal Discharge DX:	Viral syndrome

## 2023-08-29 NOTE — PATIENT PROFILE ADULT - FUNCTIONAL SCREEN CURRENT LEVEL: COMMUNICATION, MLM
Both requested meds passed refill protocol, seen on 7/31/2023. Not sure how much to order for refills.  Edgar Bee  requesting med refills, however med Trazodone 50 mg, take 2 tabs po at bedtime listed outside provider 7/6/2023.  
0 = understands/communicates without difficulty

## 2024-06-21 RX ORDER — TOPIRAMATE 25 MG
1 TABLET ORAL
Qty: 0 | Refills: 0 | DISCHARGE

## 2024-06-21 RX ORDER — PHENOBARBITAL 60 MG
1 TABLET ORAL
Qty: 0 | Refills: 0 | DISCHARGE

## 2024-06-21 RX ORDER — CLOBAZAM 10 MG/1
1 TABLET ORAL
Qty: 0 | Refills: 0 | DISCHARGE

## 2024-06-21 RX ORDER — PHENOBARBITAL 60 MG
2 TABLET ORAL
Qty: 0 | Refills: 0 | DISCHARGE